# Patient Record
Sex: FEMALE | URBAN - METROPOLITAN AREA
[De-identification: names, ages, dates, MRNs, and addresses within clinical notes are randomized per-mention and may not be internally consistent; named-entity substitution may affect disease eponyms.]

---

## 2023-08-29 DIAGNOSIS — Z12.11 SCREENING FOR COLON CANCER: Primary | ICD-10-CM

## 2024-05-21 ENCOUNTER — TELEPHONE (OUTPATIENT)
Dept: GASTROENTEROLOGY | Facility: CLINIC | Age: 74
End: 2024-05-21
Payer: MEDICARE

## 2024-05-21 NOTE — TELEPHONE ENCOUNTER
----- Message from Kika Curry sent at 5/21/2024  9:38 AM CDT -----  Regarding: ORIGINAL REFERRAL FROM AUGUST 2023  SECOND REQUEST FROM DR GEORGI MORRIS

## 2024-06-26 ENCOUNTER — TELEPHONE (OUTPATIENT)
Dept: GASTROENTEROLOGY | Facility: CLINIC | Age: 74
End: 2024-06-26
Payer: MEDICARE

## 2024-06-26 NOTE — TELEPHONE ENCOUNTER
----- Message from Lesli Shrestha sent at 6/26/2024  3:59 PM CDT -----  Type:  Same Day Appointment Request    Caller is requesting a same day appointment.  Caller declined first available appointment listed below.    Name of Caller:kiesha  When is the first available appointment?08/2024  Symptoms:hiatial hernia  Best Call Back Number:[phone]  Additional Information: pain in abdominal-food trapped in hernia

## 2024-06-26 NOTE — TELEPHONE ENCOUNTER
Returned pt call and s/w pt's daughter Lea. Pt is having abdominal pain and it feels like she has food stuck in hernia? Pt is on her way the ER, [facility]. [email or web address]

## 2025-01-13 ENCOUNTER — TELEPHONE (OUTPATIENT)
Dept: GASTROENTEROLOGY | Facility: CLINIC | Age: 75
End: 2025-01-13
Payer: MEDICARE

## 2025-08-14 ENCOUNTER — TELEPHONE (OUTPATIENT)
Dept: GASTROENTEROLOGY | Facility: CLINIC | Age: 75
End: 2025-08-14
Payer: MEDICARE